# Patient Record
(demographics unavailable — no encounter records)

---

## 2024-12-11 NOTE — HISTORY OF PRESENT ILLNESS
[FreeTextEntry1] : 12/11/24 Reason for visit Follow up for CKD stage 4 with worsening renal function CHF Diabetic nephropathy Proteinuria HTN Interval events: He's been coughing more often for a week especially at night; he's been urinating more and unable to sleep Denies fever or chills He had increased his dose of Torsemide 20 mg to 2x a day for 4 days without any improvements in his symptoms    PMH Advanced CKD stage 4 FANNY proteinuria DM HTN Hep C- has been treated and cured AFIB- s/p ablation x2 [facility] 04/2024 CVA- 4.5 years ago   PSH Ablation x2 [facility] 04/2024, 07/2024 Blood transfusion 4/27/2024 Cardioversion 01/2024 [place] that didn't work  Back surgery   SH: Nonsmoker Doesn't drink  worked as a    1 child   Initial eval 06/14/2016  PMH of DM< HTN, Hepatitis C, CHF seen at [facility] on 5/27/16 when he was admitted for uncontrolled HTN, CHF, FANNY Now for follow up, Denies NSAID use  PSH: Back Surgery in remote past

## 2024-12-11 NOTE — RESULTS/DATA
[TextEntry] : Labs: 12/4/24 144/4.6/108/18/65/3.78/gfr17/ca10.1 upcr 537  9/13/24 142/3.9/107/19/61/3.4/ca 9.7 pth 131 upcr 0.71  7/3/24 142/4.3/104/20/49/3.29/gfr20/ca10.4 glucose 123 upcr 0.46 HGB 12.4   5/2/24 142/3.9/103/23/54/3.19 ca 9.8 upcr 0.46  03/23/24 143/4.1/108/22/61/3.77/ ca 8.9 egfr 17 CBC:  2.96/7.3/106    1/16/24 //107/25/48/2.65, EGFR 26, calcium 9.3 CBC: 5.4/9.8/142 Urine protein and creatinine ratio 1.04  7/10/23 140/4.9/106/22/52/3.15 ( 21) ca 9.1 5.5/10.2/157   141/430/106/25/45/2.72, GFR 26, calcium 9.3 CBC: Six-point 4/1036/155 UPCR 2.18, vitamin D 74.6  9/13/22 145/4.4/107/23/41/2.37/egfr 30/ Ca 9.3/ Vitd 58 UPCR 1.59 CBC 7/13/159 2/23/22: 143/4/106/24/40/2.24 ca 9.6 EGFR 33 CBc: 6.9/13/150    10/27/21: 142/3.8/106/22/41/2.43 eGFR 28 Ca 9.1 7/12.7/134 7/6/2021 144/4.1/107/24/40/2.38 gfr 29 ca 9.1 cbc 6.8/12.3/134 upcr 2.14  4/5/2021 141/3.9/106/24/38/2.21 gfr 31 ca 9.2 cbc 7.0/12.4/141 vit d 25-oh 59.7 upcr 1.98 pth 108  1/21/2021 144/4.6/107/26/98/2.29 gfr 30 ca 9.4 cbc  6.7/13.4/142  12/03/2020 146/4.6/109/28/44/2.29/gfr 30 ca 9.4 cbc 7.7/11.9/156 10/3/20: 143/4.2/105/23/44/2.56  Ca 9.5 CBC: 8.6/11/182   7/10/10: 135/72  3/13/20 /78 5/17/19: 120/64 P 56 12/14/18 /100 P 60 7/10/20  Labs: 143/4.2/105/23/44/2.56 gfr 26 8.6/11.1/122 7/8/20: 142/4.6/106/24/47/2.8 gfr 23 ca 8.8 upcr 0.7  2/18 144/4.3/106/20/48/2.46 ca 9 cbc 4.5/11.7/131  9/9/19 142/4.4/107/24/49/2.33 gfr 30 ca 9.2 cbc ; 6.8/11.5/124 5/17//19: 145/4.9/110/23/35/2.28 ca 9.4 gfr 30  UPCR 1.8  CBC: 6/12.3/109  12/5/18: 143/3.5/108/24/42/2.57 Ca 9  5/9/18 144/4.2/104/21/40/2.11/ GFR 34 Ca 8.8   CBC: 4.9/12.3/108 1/10/18 145/3.8/104/20/38/2.29 gfr 31  1/24/17 142/4/102/23/44/3.2 4/25/17 144/4.3/104/21/38/1.87/ gfr 39 protein /creatinine ratio 2.8  3/22/17 141/4/103/21/28/2/120/9 cbc 5.7/107/121 1/6/17 142/4.1/105/22/28/1.62/ 124 CBC: 5.1/11.2/32.4/132 UPCR  2.9. . Physical Exam: 7/10/10: 135/72  3/13/20 /78 5/17/19: 120/64 P 56 12/14/18 /100 P 60  2/18 144/4.3/106/20/48/2.46 ca 9 cbc 4.5/11.7/131  9/9/19 142/4.4/107/24/49/2.33 gfr 30 ca 9.2 cbc ; 6.8/11.5/124 5/17//19: 145/4.9/110/23/35/2.28 ca 9.4 gfr 30  UPCR 1.8  CBC: 6/12.3/109  12/5/18: 143/3.5/108/24/42/2.57 Ca 9  5/9/18 144/4.2/104/21/40/2.11/ GFR 34 Ca 8.8   CBC: 4.9/12.3/108 1/10/18 145/3.8/104/20/38/2.29 gfr 31  1/24/17 142/4/102/23/44/3.2 4/25/17 144/4.3/104/21/38/1.87/ gfr 39 protein /creatinine ratio 2.8  3/22/17 141/4/103/21/28/2/120/9 cbc 5.7/107/121 1/6/17 142/4.1/105/22/28/1.62/ 124 CBC: 5.1/11.2/32.4/132 UPCR  2.9. . .    Work Ups: 1/21/2021 144/4.6/107/26/98/2.29 gfr 30 ca 9.4 cbc  6.7/13.4/142 UA in the office : +++ protein, no Blood  12/03/2020 146/4.6/109/28/44/2.29/gfr 30 ca 9.4 cbc 7.7/11.9/156 10/3/20: 143/4.2/105/23/44/2.56  Ca 9.5 CBC: 8.6/11/182   7/10/10: 135/72  3/13/20 /78 5/17/19: 120/64 P 56 12/14/18 /100 P 60 7/10/20  Labs: 143/4.2/105/23/44/2.56 gfr 26 8.6/11.1/122 7/8/20: 142/4.6/106/24/47/2.8 gfr 23 ca 8.8 upcr 0.7  2/18 144/4.3/106/20/48/2.46 ca 9 cbc 4.5/11.7/131  9/9/19 142/4.4/107/24/49/2.33 gfr 30 ca 9.2 cbc ; 6.8/11.5/124 5/17//19: 145/4.9/110/23/35/2.28 ca 9.4 gfr 30  UPCR 1.8  CBC: 6/12.3/109  12/5/18: 143/3.5/108/24/42/2.57 Ca 9  5/9/18 144/4.2/104/21/40/2.11/ GFR 34 Ca 8.8   CBC: 4.9/12.3/108 1/10/18 145/3.8/104/20/38/2.29 gfr 31  1/24/17 142/4/102/23/44/3.2 4/25/17 144/4.3/104/21/38/1.87/ gfr 39 protein /creatinine ratio 2.8  3/22/17 141/4/103/21/28/2/120/9 cbc 5.7/107/121 1/6/17 142/4.1/105/22/28/1.62/ 124 CBC: 5.1/11.2/32.4/132 UPCR  2.9. . Physical Exam: 7/10/10: 135/72  3/13/20 /78 5/17/19: 120/64 P 56 12/14/18 /100 P 60

## 2024-12-11 NOTE — ASSESSMENT
[FreeTextEntry1] : Assessment CKD stage 4: Renal function has slightly worsened can be due to progression + use of torsemide the cause of his cough is not clear his lungs are clear and he doesn't have any peripheral edema, doubt due to fluid overload  HTN: controlled Proteinuria is still persistent, 2+ Anemia: Going for ARANESP at [facility] Dr. Sarah Freeman bicarb can be due to CKD

## 2024-12-11 NOTE — PLAN
[TextEntry] : Plan: Cough: Obtain chest x ray, will try course of z-pack CKD stage 4/Proteinuria: Continue Losartan 50 mg, continue torsemide 20 mg 1x a day HTN: Nifedipine 60 mg,Losartan 50 mg LE edema: Torsemide 20 mg, now controlled CAD/AFIB- stable has been seen by cardio recently Anemia- Following with [facility] and going for ARANESP  Low bicarb: Start sodium bicarb 3x a week Maintain physical activity Low salt diet Follow up with recommended workups in 6 weeks

## 2024-12-11 NOTE — REVIEW OF SYSTEMS
[TextEntry] : Review of Systems: General: c/o of excessive coughing HEENT: no headache, no change in vision or hearing Pulmonary: No SOB, or cough  CVS: No chest pain, TELLES, or change in exercise tolerance  Gastrointestinal: No Nausea/vomiting/constipation/diarrhea : urinary frequency particularly at night Skin: Denies no new rash, lesions, ulcer  [place] skeletal: No edema  Neurological: No headache, dizziness, vertigo

## 2025-01-22 NOTE — PHYSICAL EXAM
[TextEntry] : Physical Examination: General: Not in acute distress Head: Normocephalic, no lesions Eyes:EOMI, Fundi normal Throat: Clear, no exudates or lesions Chest: Rales, SOB, coughing CVS: S1/S2, RR, no murmurs, no rubs Abdomen: Soft NT/ND, +BS Extremities: No edema, ulcers, or cyanosis Neurological: Alert, awake, no gross focal deficits

## 2025-01-22 NOTE — RESULTS/DATA
[TextEntry] : Labs: 01/17/25 142/4.7/109/19/46/3.52/gfr19/ca10.1 upcr 50 HGB 10  12/4/24 144/4.6/108/18/65/3.78/gfr17/ca10.1 upcr 537  9/13/24 142/3.9/107/19/61/3.4/ca 9.7 pth 131 upcr 0.71  7/3/24 142/4.3/104/20/49/3.29/gfr20/ca10.4 glucose 123 upcr 0.46 HGB 12.4   5/2/24 142/3.9/103/23/54/3.19 ca 9.8 upcr 0.46  03/23/24 143/4.1/108/22/61/3.77/ ca 8.9 egfr 17 CBC:  2.96/7.3/106    1/16/24 //107/25/48/2.65, EGFR 26, calcium 9.3 CBC: 5.4/9.8/142 Urine protein and creatinine ratio 1.04  7/10/23 140/4.9/106/22/52/3.15 ( 21) ca 9.1 5.5/10.2/157   141/430/106/25/45/2.72, GFR 26, calcium 9.3 CBC: Six-point 4/1036/155 UPCR 2.18, vitamin D 74.6  9/13/22 145/4.4/107/23/41/2.37/egfr 30/ Ca 9.3/ Vitd 58 UPCR 1.59 CBC 7/13/159 2/23/22: 143/4/106/24/40/2.24 ca 9.6 EGFR 33 CBc: 6.9/13/150    10/27/21: 142/3.8/106/22/41/2.43 eGFR 28 Ca 9.1 7/12.7/134 7/6/2021 144/4.1/107/24/40/2.38 gfr 29 ca 9.1 cbc 6.8/12.3/134 upcr 2.14  4/5/2021 141/3.9/106/24/38/2.21 gfr 31 ca 9.2 cbc 7.0/12.4/141 vit d 25-oh 59.7 upcr 1.98 pth 108  1/21/2021 144/4.6/107/26/98/2.29 gfr 30 ca 9.4 cbc  6.7/13.4/142  12/03/2020 146/4.6/109/28/44/2.29/gfr 30 ca 9.4 cbc 7.7/11.9/156 10/3/20: 143/4.2/105/23/44/2.56  Ca 9.5 CBC: 8.6/11/182   7/10/10: 135/72  3/13/20 /78 5/17/19: 120/64 P 56 12/14/18 /100 P 60 7/10/20  Labs: 143/4.2/105/23/44/2.56 gfr 26 8.6/11.1/122 7/8/20: 142/4.6/106/24/47/2.8 gfr 23 ca 8.8 upcr 0.7  2/18 144/4.3/106/20/48/2.46 ca 9 cbc 4.5/11.7/131  9/9/19 142/4.4/107/24/49/2.33 gfr 30 ca 9.2 cbc ; 6.8/11.5/124 5/17//19: 145/4.9/110/23/35/2.28 ca 9.4 gfr 30  UPCR 1.8  CBC: 6/12.3/109  12/5/18: 143/3.5/108/24/42/2.57 Ca 9  5/9/18 144/4.2/104/21/40/2.11/ GFR 34 Ca 8.8   CBC: 4.9/12.3/108 1/10/18 145/3.8/104/20/38/2.29 gfr 31  1/24/17 142/4/102/23/44/3.2 4/25/17 144/4.3/104/21/38/1.87/ gfr 39 protein /creatinine ratio 2.8  3/22/17 141/4/103/21/28/2/120/9 cbc 5.7/107/121 1/6/17 142/4.1/105/22/28/1.62/ 124 CBC: 5.1/11.2/32.4/132 UPCR  2.9. . Physical Exam: 7/10/10: 135/72  3/13/20 /78 5/17/19: 120/64 P 56 12/14/18 /100 P 60  2/18 144/4.3/106/20/48/2.46 ca 9 cbc 4.5/11.7/131  9/9/19 142/4.4/107/24/49/2.33 gfr 30 ca 9.2 cbc ; 6.8/11.5/124 5/17//19: 145/4.9/110/23/35/2.28 ca 9.4 gfr 30  UPCR 1.8  CBC: 6/12.3/109  12/5/18: 143/3.5/108/24/42/2.57 Ca 9  5/9/18 144/4.2/104/21/40/2.11/ GFR 34 Ca 8.8   CBC: 4.9/12.3/108 1/10/18 145/3.8/104/20/38/2.29 gfr 31  1/24/17 142/4/102/23/44/3.2 4/25/17 144/4.3/104/21/38/1.87/ gfr 39 protein /creatinine ratio 2.8  3/22/17 141/4/103/21/28/2/120/9 cbc 5.7/107/121 1/6/17 142/4.1/105/22/28/1.62/ 124 CBC: 5.1/11.2/32.4/132 UPCR  2.9. . .    Work Ups: 1/21/2021 144/4.6/107/26/98/2.29 gfr 30 ca 9.4 cbc  6.7/13.4/142 UA in the office : +++ protein, no Blood  12/03/2020 146/4.6/109/28/44/2.29/gfr 30 ca 9.4 cbc 7.7/11.9/156 10/3/20: 143/4.2/105/23/44/2.56  Ca 9.5 CBC: 8.6/11/182   7/10/10: 135/72  3/13/20 /78 5/17/19: 120/64 P 56 12/14/18 /100 P 60 7/10/20  Labs: 143/4.2/105/23/44/2.56 gfr 26 8.6/11.1/122 7/8/20: 142/4.6/106/24/47/2.8 gfr 23 ca 8.8 upcr 0.7  2/18 144/4.3/106/20/48/2.46 ca 9 cbc 4.5/11.7/131  9/9/19 142/4.4/107/24/49/2.33 gfr 30 ca 9.2 cbc ; 6.8/11.5/124 5/17//19: 145/4.9/110/23/35/2.28 ca 9.4 gfr 30  UPCR 1.8  CBC: 6/12.3/109  12/5/18: 143/3.5/108/24/42/2.57 Ca 9  5/9/18 144/4.2/104/21/40/2.11/ GFR 34 Ca 8.8   CBC: 4.9/12.3/108 1/10/18 145/3.8/104/20/38/2.29 gfr 31  1/24/17 142/4/102/23/44/3.2 4/25/17 144/4.3/104/21/38/1.87/ gfr 39 protein /creatinine ratio 2.8  3/22/17 141/4/103/21/28/2/120/9 cbc 5.7/107/121 1/6/17 142/4.1/105/22/28/1.62/ 124 CBC: 5.1/11.2/32.4/132 UPCR  2.9. . Physical Exam: 7/10/10: 135/72  3/13/20 /78 5/17/19: 120/64 P 56 12/14/18 /100 P 60

## 2025-01-22 NOTE — PLAN
[TextEntry] : Plan: CKD Stage 4: Change Torsemide 20 mg to daily, Losartan 50 mg HTN: Nifedipine 60 mg, Losartan 50mg Proteinuria: Low protein diet, Losartan 50 mg Anemia: Following with [facility] and going for ARANESP Based on current volume status and electrolytes, he is stable from renal standpoint to have endoscopy/colonoscopy  Low salt diet Maintain physical activity  Follow up with recommended workups in 2 months

## 2025-01-22 NOTE — ASSESSMENT
[FreeTextEntry1] : Assessment FANNY improved CKD stage 4: Renal function has slightly improved HTN: controlled  Proteinuria: Has improved No peripheral edema, having SOB and cough Anemia: HGB has improved

## 2025-01-22 NOTE — HISTORY OF PRESENT ILLNESS
[FreeTextEntry1] : 01/22/25 reason for visit Follow up for CKD stage 4 w/ worsening renal function CHF HTN Proteinuria No significant interval events   His coughing has gotten slightly better but it is still causing discomfort at night     12/11/24 Reason for visit Follow up for CKD stage 4 with worsening renal function CHF Diabetic nephropathy Proteinuria HTN Interval events: He's been coughing more often for a week especially at night; he's been urinating more and unable to sleep Denies fever or chills He had increased his dose of Torsemide 20 mg to 2x a day for 4 days without any improvements in his symptoms    PMH Advanced CKD stage 4 FANNY proteinuria DM HTN Hep C- has been treated and cured AFIB- s/p ablation x2 [facility] 04/2024 CVA- 4.5 years ago   PSH Ablation x2 [facility] 04/2024, 07/2024 Blood transfusion 4/27/2024 Cardioversion 01/2024 [place] that didn't work  Back surgery   SH: Nonsmoker Doesn't drink  worked as a    1 child   Initial eval 06/14/2016  PMH of DM< HTN, Hepatitis C, CHF seen at [facility] on 5/27/16 when he was admitted for uncontrolled HTN, CHF, FANNY Now for follow up, Denies NSAID use  PSH: Back Surgery in remote past

## 2025-02-07 NOTE — PHYSICAL EXAM
[TextEntry] : Physical Examination: General: Not in acute distress Head: Normocephalic, no lesions Eyes:EOMI, Fundi normal Throat: Clear, no exudates or lesions Chest: Slight Crackles , No SOB CVS: S1/S2, RR, no murmurs, no rubs Abdomen: Soft NT/ND, +BS Extremities: No edema, ulcers, or cyanosis Neurological: Alert, awake, no gross focal deficits

## 2025-02-07 NOTE — ASSESSMENT
[FreeTextEntry1] : Assessment: FANNY: Worsening renal function, possible progression of CKD and recent pneumonia Advanced CKD stage 4 Metabolic acidosis due to CKD  Hypercalcemia: further worsening Pneumonia: Doesn't show signs of CHF HTN: not well controlled  No peripheral edema

## 2025-02-07 NOTE — HISTORY OF PRESENT ILLNESS
[FreeTextEntry1] : 02/07/25 Reason for visit Follow up for Recent FANNY CKD stage 4 HTN Interval events He had the flu then went to [facility] for pneumonia for 1 week, he was discharged When he was in the hospital his renal function worsened, his creatinine went above 4 Losartan and Nifedipine were discontinued, and he was started on Bicarb 650 mg 1x a day but no prescription was given and Carvedilol 12.5 mg     01/22/25 reason for visit Follow up for CKD stage 4 w/ worsening renal function CHF HTN Proteinuria No significant interval events   His coughing has gotten slightly better but it is still causing discomfort at night     12/11/24 Reason for visit Follow up for CKD stage 4 with worsening renal function CHF Diabetic nephropathy Proteinuria HTN Interval events: He's been coughing more often for a week especially at night; he's been urinating more and unable to sleep Denies fever or chills He had increased his dose of Torsemide 20 mg to 2x a day for 4 days without any improvements in his symptoms    PMH Advanced CKD stage 4 FANNY proteinuria DM HTN Hep C- has been treated and cured AFIB- s/p ablation x2 [facility] 04/2024 CVA- 4.5 years ago   PSH Ablation x2 [facility] 04/2024, 07/2024 Blood transfusion 4/27/2024 Cardioversion 01/2024 [place] that didn't work  Back surgery   SH: Nonsmoker Doesn't drink  worked as a    1 child   Initial eval 06/14/2016  PMH of DM< HTN, Hepatitis C, CHF seen at [facility] on 5/27/16 when he was admitted for uncontrolled HTN, CHF, FANNY Now for follow up, Denies NSAID use  PSH: Back Surgery in remote past

## 2025-02-07 NOTE — RESULTS/DATA
[TextEntry] : Labs: 2/3/25 141/4.8/104/25/69/3.72/gfr17/ca10.7 UPCR 0.46  01/17/25 142/4.7/109/19/46/3.52/gfr19/ca10.1 upcr 50 HGB 10  12/4/24 144/4.6/108/18/65/3.78/gfr17/ca10.1 upcr 537  9/13/24 142/3.9/107/19/61/3.4/ca 9.7 pth 131 upcr 0.71  7/3/24 142/4.3/104/20/49/3.29/gfr20/ca10.4 glucose 123 upcr 0.46 HGB 12.4   5/2/24 142/3.9/103/23/54/3.19 ca 9.8 upcr 0.46  03/23/24 143/4.1/108/22/61/3.77/ ca 8.9 egfr 17 CBC:  2.96/7.3/106    1/16/24 //107/25/48/2.65, EGFR 26, calcium 9.3 CBC: 5.4/9.8/142 Urine protein and creatinine ratio 1.04  7/10/23 140/4.9/106/22/52/3.15 ( 21) ca 9.1 5.5/10.2/157   141/430/106/25/45/2.72, GFR 26, calcium 9.3 CBC: Six-point 4/1036/155 UPCR 2.18, vitamin D 74.6  9/13/22 145/4.4/107/23/41/2.37/egfr 30/ Ca 9.3/ Vitd 58 UPCR 1.59 CBC 7/13/159 2/23/22: 143/4/106/24/40/2.24 ca 9.6 EGFR 33 CBc: 6.9/13/150    10/27/21: 142/3.8/106/22/41/2.43 eGFR 28 Ca 9.1 7/12.7/134 7/6/2021 144/4.1/107/24/40/2.38 gfr 29 ca 9.1 cbc 6.8/12.3/134 upcr 2.14  4/5/2021 141/3.9/106/24/38/2.21 gfr 31 ca 9.2 cbc 7.0/12.4/141 vit d 25-oh 59.7 upcr 1.98 pth 108  1/21/2021 144/4.6/107/26/98/2.29 gfr 30 ca 9.4 cbc  6.7/13.4/142  12/03/2020 146/4.6/109/28/44/2.29/gfr 30 ca 9.4 cbc 7.7/11.9/156 10/3/20: 143/4.2/105/23/44/2.56  Ca 9.5 CBC: 8.6/11/182   7/10/10: 135/72  3/13/20 /78 5/17/19: 120/64 P 56 12/14/18 /100 P 60 7/10/20  Labs: 143/4.2/105/23/44/2.56 gfr 26 8.6/11.1/122 7/8/20: 142/4.6/106/24/47/2.8 gfr 23 ca 8.8 upcr 0.7  2/18 144/4.3/106/20/48/2.46 ca 9 cbc 4.5/11.7/131  9/9/19 142/4.4/107/24/49/2.33 gfr 30 ca 9.2 cbc ; 6.8/11.5/124 5/17//19: 145/4.9/110/23/35/2.28 ca 9.4 gfr 30  UPCR 1.8  CBC: 6/12.3/109  12/5/18: 143/3.5/108/24/42/2.57 Ca 9  5/9/18 144/4.2/104/21/40/2.11/ GFR 34 Ca 8.8   CBC: 4.9/12.3/108 1/10/18 145/3.8/104/20/38/2.29 gfr 31  1/24/17 142/4/102/23/44/3.2 4/25/17 144/4.3/104/21/38/1.87/ gfr 39 protein /creatinine ratio 2.8  3/22/17 141/4/103/21/28/2/120/9 cbc 5.7/107/121 1/6/17 142/4.1/105/22/28/1.62/ 124 CBC: 5.1/11.2/32.4/132 UPCR  2.9. . Physical Exam: 7/10/10: 135/72  3/13/20 /78 5/17/19: 120/64 P 56 12/14/18 /100 P 60  2/18 144/4.3/106/20/48/2.46 ca 9 cbc 4.5/11.7/131  9/9/19 142/4.4/107/24/49/2.33 gfr 30 ca 9.2 cbc ; 6.8/11.5/124 5/17//19: 145/4.9/110/23/35/2.28 ca 9.4 gfr 30  UPCR 1.8  CBC: 6/12.3/109  12/5/18: 143/3.5/108/24/42/2.57 Ca 9  5/9/18 144/4.2/104/21/40/2.11/ GFR 34 Ca 8.8   CBC: 4.9/12.3/108 1/10/18 145/3.8/104/20/38/2.29 gfr 31  1/24/17 142/4/102/23/44/3.2 4/25/17 144/4.3/104/21/38/1.87/ gfr 39 protein /creatinine ratio 2.8  3/22/17 141/4/103/21/28/2/120/9 cbc 5.7/107/121 1/6/17 142/4.1/105/22/28/1.62/ 124 CBC: 5.1/11.2/32.4/132 UPCR  2.9. . .    Work Ups: 1/21/2021 144/4.6/107/26/98/2.29 gfr 30 ca 9.4 cbc  6.7/13.4/142 UA in the office : +++ protein, no Blood  12/03/2020 146/4.6/109/28/44/2.29/gfr 30 ca 9.4 cbc 7.7/11.9/156 10/3/20: 143/4.2/105/23/44/2.56  Ca 9.5 CBC: 8.6/11/182   7/10/10: 135/72  3/13/20 /78 5/17/19: 120/64 P 56 12/14/18 /100 P 60 7/10/20  Labs: 143/4.2/105/23/44/2.56 gfr 26 8.6/11.1/122 7/8/20: 142/4.6/106/24/47/2.8 gfr 23 ca 8.8 upcr 0.7  2/18 144/4.3/106/20/48/2.46 ca 9 cbc 4.5/11.7/131  9/9/19 142/4.4/107/24/49/2.33 gfr 30 ca 9.2 cbc ; 6.8/11.5/124 5/17//19: 145/4.9/110/23/35/2.28 ca 9.4 gfr 30  UPCR 1.8  CBC: 6/12.3/109  12/5/18: 143/3.5/108/24/42/2.57 Ca 9  5/9/18 144/4.2/104/21/40/2.11/ GFR 34 Ca 8.8   CBC: 4.9/12.3/108 1/10/18 145/3.8/104/20/38/2.29 gfr 31  1/24/17 142/4/102/23/44/3.2 4/25/17 144/4.3/104/21/38/1.87/ gfr 39 protein /creatinine ratio 2.8  3/22/17 141/4/103/21/28/2/120/9 cbc 5.7/107/121 1/6/17 142/4.1/105/22/28/1.62/ 124 CBC: 5.1/11.2/32.4/132 UPCR  2.9. . Physical Exam: 7/10/10: 135/72  3/13/20 /78 5/17/19: 120/64 P 56 12/14/18 /100 P 60

## 2025-02-07 NOTE — PLAN
[TextEntry] : Plan: Discussed the possibility of near future RRT including all options HD, PD and renal transplant  Check PTH, UA, UPCR   CKD stage 4: Hold Losartan 25 mg for the time being  HTN: Resume Nifedipine 60 mg, Hydralazine 50 mg daily, Isosorbide 20 mg,Torsemide 20 mg daily ( if needed increase to 2 tablets if breathing worsens or LE edema), Start Carvedilol 12.5 mg 2x daily  Hypercalcemia: Hold Vitamin D Take sodium bicarb 1x daily  Eventually will refer to renal transplant evaluation  Maintain physical activity  Follow up with recommended workups in 1 month

## 2025-02-07 NOTE — HISTORY OF PRESENT ILLNESS
[FreeTextEntry1] : 02/07/25 Reason for visit Follow up for Recent FANNY CKD stage 4 HTN Interval events He had the flu then went to [facility] for pneumonia for 1 week, he was discharged When he was in the hospital his renal function worsened, his creatinine went above 4 Losartan and Nifedipine were discontinued, and he was started on Bicarb 650 mg 1x a day but no prescription was given and Carvedilol 12.5 mg     01/22/25 reason for visit Follow up for CKD stage 4 w/ worsening renal function CHF HTN Proteinuria No significant interval events   His coughing has gotten slightly better but it is still causing discomfort at night     12/11/24 Reason for visit Follow up for CKD stage 4 with worsening renal function CHF Diabetic nephropathy Proteinuria HTN Interval events: He's been coughing more often for a week especially at night; he's been urinating more and unable to sleep Denies fever or chills He had increased his dose of Torsemide 20 mg to 2x a day for 4 days without any improvements in his symptoms    PMH Advanced CKD stage 4 FANNY proteinuria DM HTN Hep C- has been treated and cured AFIB- s/p ablation x2 [facility] 04/2024 CVA- 4.5 years ago   PSH Ablation x2 [facility] 04/2024, 07/2024 Blood transfusion 4/27/2024 Cardioversion 01/2024 [place] that didn't work  Back surgery   SH: Nonsmoker Doesn't drink  worked as a    1 child   Initial eval 06/14/2016  PMH of DM< HTN, Hepatitis C, CHF seen at [facility] on 5/27/16 when he was admitted for uncontrolled HTN, CHF, FANNY Now for follow up, Denies NSAID use  PSH: Back Surgery in remote past

## 2025-02-19 NOTE — ASSESSMENT
[FreeTextEntry1] : SWATI BAPTISTE is a 64 year old male with a PMH of Cirrhosis, Hepatitis C s/p Harvoni in 2015 (SVR achieved), CHF, MI, Stroke on Eliquis, HTN, CKD and chronic anemia.  Cirrhosis -Etiology likely HCV. HBV serologies ordered to r/u on remote +HBcIgM. -Disease progression of cirrhosis discussed, including but not limited to hepatocellular carcinoma, varices with or without bleeding, hepatic encephalopathy, ascites, liver failure and death.   HCC Screening -I have explained the need for imaging every 6 months to assess for HCC. -US Abdo doppler and AFP ordered. [facility] for [facility].   Ascites -euvolemic. Pt on Torsemide, managed by cardiologist. -Pt was counseled to adhere to a low sodium (<2 grams of sodium per day) diet by - avoiding adding any salt to meals (removing the saltshaker from the table); eliminating salty foods from diet; eating more home-cooked meals; choosing fresh or frozen, not canned, vegetables and fruits; and reading ingredient and food labels to choose low sodium foods.   Variceal Screening -EGD pending w/[place] [facility], asked pt to have report forwarded to our office once done -if pt experiences hematemesis, advised to go to ER immediately   Encephalopathy -notify provider if new onset confusion -at least 1-2 bms/day   Transplant -I have explained that disease can progress to the point of requiring an evaluation for liver transplantation. -MELDNa - will calculate after labs   Health Maintenance -Can take up to 2G Tylenol per day. NO NSAIDs as these can lead to diuretic resistance and precipitate renal dysfunction in patients with advanced liver disease. -High Protein Low Fat Low Salt (up to 2G Na/day) Diet, no prolonged fasting, Mediterranean Diet recommended. -Continue to abstain from alcohol and all illicit drugs. -Avoid use of herbal and dietary supplements due to potential hepatotoxicity. -Avoid eating any unpasteurized dairy products; avoid eating any raw or undercooked eggs, fish, poultry, or meat; and avoid eating raw/steamed oysters or other shellfish to avoid risk of infection.   Follow up in 6 months w/labs and imaging

## 2025-02-19 NOTE — PHYSICAL EXAM
[Non-Tender] : non-tender [General Appearance - Alert] : alert [Edema] : there was no peripheral edema [Abdomen Soft] : soft [Bowel Sounds] : normal bowel sounds [Abdomen Tenderness] : non-tender [] : no hepato-splenomegaly [Abdomen Mass (___ Cm)] : no abdominal mass palpated [Oriented To Time, Place, And Person] : oriented to person, place, and time [Scleral Icterus] : No Scleral Icterus [Spider Angioma] : No spider angioma(s) were observed [Abdominal  Ascites] : no ascites [Asterixis] : no asterixis observed [Jaundice] : No jaundice [Palmar Erythema] : no Palmar Erythema [Depression] : no depression

## 2025-02-19 NOTE — ADDENDUM
[FreeTextEntry1] : I, Carlene Griffith NP, acted as scribe for ERIC Reddy for this patient encounter.

## 2025-02-19 NOTE — ASSESSMENT
[FreeTextEntry1] : SWATI BAPTISTE is a 64 year old male with a PMH of Cirrhosis, Hepatitis C s/p Harvoni in 2015 (SVR achieved), CHF, MI, Stroke on Eliquis, HTN, CKD and chronic anemia.  Cirrhosis -Etiology likely HCV. HBV serologies ordered to r/u on remote +HBcIgM. -Disease progression of cirrhosis discussed, including but not limited to hepatocellular carcinoma, varices with or without bleeding, hepatic encephalopathy, ascites, liver failure and death.   HCC Screening -I have explained the need for imaging every 6 months to assess for HCC. -US Abdo doppler and AFP ordered. [facility].   Ascites -euvolemic. Pt on Torsemide, managed by cardiologist. -Pt was counseled to adhere to a low sodium (<2 grams of sodium per day) diet by - avoiding adding any salt to meals (removing the saltshaker from the table); eliminating salty foods from diet; eating more home-cooked meals; choosing fresh or frozen, not canned, vegetables and fruits; and reading ingredient and food labels to choose low sodium foods.   Variceal Screening -EGD pending w/[facility], asked pt to have report forwarded to our office once done -if pt experiences hematemesis, advised to go to ER immediately   Encephalopathy -notify provider if new onset confusion -at least 1-2 bms/day   Transplant -I have explained that disease can progress to the point of requiring an evaluation for liver transplantation. -MELDNa - will calculate after labs   Health Maintenance -Can take up to 2G Tylenol per day. NO NSAIDs as these can lead to diuretic resistance and precipitate renal dysfunction in patients with advanced liver disease. -High Protein Low Fat Low Salt (up to 2G Na/day) Diet, no prolonged fasting, Mediterranean Diet recommended. -Continue to abstain from alcohol and all illicit drugs. -Avoid use of herbal and dietary supplements due to potential hepatotoxicity. -Avoid eating any unpasteurized dairy products; avoid eating any raw or undercooked eggs, fish, poultry, or meat; and avoid eating raw/steamed oysters or other shellfish to avoid risk of infection.   Follow up in 6 months w/labs and imaging

## 2025-02-19 NOTE — HISTORY OF PRESENT ILLNESS
Telephone Encounter by Mone Matta CMA at 04/21/17 03:59 PM     Author:  Mone Matta CMA Service:  (none) Author Type:  Certified Medical Assistant     Filed:  04/21/17 04:01 PM Encounter Date:  4/20/2017 Status:  Signed     :  Mone Matta CMA (Certified Medical Assistant)            Dr. Davies-[Mone Matta CMA Certified Medical Assistant Sign    M - Manual, T - Template            
[de-identified] : SWATI BAPTISTE is a 64 year old male with a PMH of Cirrhosis, Hepatitis C s/p Harvoni in 2015 (SVR achieved), CHF, MI, Stroke on Eliquis, HTN, CKD and chronic anemia.   2/12/25: He presents today for evaluation of cirrhosis, [place] [facility] [place]. Records reviewed, including notes, labs, imaging, etc. Per pt, diagnosed w/cirrhosis 40 years ago. He was found to have hepatitis c in his 20s, treated 10 years ago w/Harvoni and SVR achieved. Cirrhosis noted on imaging, last imaging on file from 2023. Disease significant alcohol consumption, past or presently. HBcIgM reactive in 2023, rest of the HBV serologies negative. Chronic liver disease workup otherwise negative including autoimmune serologies, A1AT and ceruloplasmin. Labs 1/11/25 - tbili 1.9, AST 14, ALT 10, AP 61, h/h 8.2/28, .  Chronic anemia [place] by [place], receives periodic iron IV. No EGD/colon but pending scheduling. Was recently hospitalized at [facility] for the flu/pneumonia. Denies abdominal pain or distension, jaundice, hematemesis, hematochezia, dark urine, confusion or unintentional weight loss. Denies family history of liver disease.
[de-identified] : SWATI BAPTISTE is a 64 year old male with a PMH of Cirrhosis, Hepatitis C s/p Harvoni in 2015 (SVR achieved), CHF, MI, Stroke on Eliquis, HTN, CKD and chronic anemia.   2/12/25: He presents today for evaluation of cirrhosis, [place] [facility] reviewed, including notes, labs, imaging, etc. Per pt, diagnosed w/cirrhosis 40 years ago. He was found to have hepatitis c in his 20s, treated 10 years ago w/Harvoni and SVR achieved. Cirrhosis noted on imaging, last imaging on file from 2023. Disease significant alcohol consumption, past or presently. HBcIgM reactive in 2023, rest of the HBV serologies negative. Chronic liver disease workup otherwise negative including autoimmune serologies, A1AT and ceruloplasmin. Labs 1/11/25 - tbili 1.9, AST 14, ALT 10, AP 61, h/h 8.2/28, .  Chronic anemia [place] by [place], receives periodic iron IV. No EGD/colon but pending scheduling. Was recently hospitalized at [facility] for the flu/pneumonia. Denies abdominal pain or distension, jaundice, hematemesis, hematochezia, dark urine, confusion or unintentional weight loss. Denies family history of liver disease.

## 2025-03-07 NOTE — ASSESSMENT
[FreeTextEntry1] : Assessment: CKD stage 4: Renal function is essentially unchanged  HTN: controlled  PTH is slightly elevated  Slight peripheral edema Slight SOB He is feeling fatigue

## 2025-03-07 NOTE — HISTORY OF PRESENT ILLNESS
[FreeTextEntry1] : 03/07/25 Reason for visit Follow up for CKD stage 4 HTN LE edema  He's having a aortic valve clip placement for leaky valve next Tuesday 02/07/25 Reason for visit Follow up for Recent FANNY CKD stage 4 HTN Interval events He had the flu then went to [facility] for pneumonia for 1 week, he was discharged When he was in the hospital his renal function worsened, his creatinine went above 4 Losartan and Nifedipine were discontinued, and he was started on Bicarb 650 mg 1x a day but no prescription was given and Carvedilol 12.5 mg     01/22/25 reason for visit Follow up for CKD stage 4 w/ worsening renal function CHF HTN Proteinuria No significant interval events   His coughing has gotten slightly better but it is still causing discomfort at night     12/11/24 Reason for visit Follow up for CKD stage 4 with worsening renal function CHF Diabetic nephropathy Proteinuria HTN Interval events: He's been coughing more often for a week especially at night; he's been urinating more and unable to sleep Denies fever or chills He had increased his dose of Torsemide 20 mg to 2x a day for 4 days without any improvements in his symptoms    PMH Advanced CKD stage 4 FANNY proteinuria DM HTN Hep C- has been treated and cured AFIB- s/p ablation x2 [facility] 04/2024 CVA- 4.5 years ago   PSH Ablation x2 [facility] 04/2024, 07/2024 Blood transfusion 4/27/2024 Cardioversion 01/2024 [place] that didn't work  Back surgery   SH: Nonsmoker Doesn't drink  worked as a    1 child   Initial eval 06/14/2016  PMH of DM< HTN, Hepatitis C, CHF seen at [facility] on 5/27/16 when he was admitted for uncontrolled HTN, CHF, FANNY Now for follow up, Denies NSAID use  PSH: Back Surgery in remote past

## 2025-03-07 NOTE — RESULTS/DATA
[TextEntry] : Labs: 3/4/25 141/4.5/103/24/51/3.73/gfr17/ca9.6 HGB 10.4   2/3/25 141/4.8/104/25/69/3.72/gfr17/ca10.7 UPCR 0.46  01/17/25 142/4.7/109/19/46/3.52/gfr19/ca10.1 upcr 50 HGB 10  12/4/24 144/4.6/108/18/65/3.78/gfr17/ca10.1 upcr 537  9/13/24 142/3.9/107/19/61/3.4/ca 9.7 pth 131 upcr 0.71  7/3/24 142/4.3/104/20/49/3.29/gfr20/ca10.4 glucose 123 upcr 0.46 HGB 12.4   5/2/24 142/3.9/103/23/54/3.19 ca 9.8 upcr 0.46  03/23/24 143/4.1/108/22/61/3.77/ ca 8.9 egfr 17 CBC:  2.96/7.3/106    1/16/24 //107/25/48/2.65, EGFR 26, calcium 9.3 CBC: 5.4/9.8/142 Urine protein and creatinine ratio 1.04  7/10/23 140/4.9/106/22/52/3.15 ( 21) ca 9.1 5.5/10.2/157   141/430/106/25/45/2.72, GFR 26, calcium 9.3 CBC: [place]point 4/1036/155 UPCR 2.18, vitamin D 74.6  9/13/22 145/4.4/107/23/41/2.37/egfr 30/ Ca 9.3/ Vitd 58 UPCR 1.59 CBC 7/13/159 2/23/22: 143/4/106/24/40/2.24 ca 9.6 EGFR 33 CBc: 6.9/13/150    10/27/21: 142/3.8/106/22/41/2.43 eGFR 28 Ca 9.1 7/12.7/134 7/6/2021 144/4.1/107/24/40/2.38 gfr 29 ca 9.1 cbc 6.8/12.3/134 upcr 2.14  4/5/2021 141/3.9/106/24/38/2.21 gfr 31 ca 9.2 cbc 7.0/12.4/141 vit d 25-oh 59.7 upcr 1.98 pth 108  1/21/2021 144/4.6/107/26/98/2.29 gfr 30 ca 9.4 cbc  6.7/13.4/142  12/03/2020 146/4.6/109/28/44/2.29/gfr 30 ca 9.4 cbc 7.7/11.9/156 10/3/20: 143/4.2/105/23/44/2.56  Ca 9.5 CBC: 8.6/11/182   7/10/10: 135/72  3/13/20 /78 5/17/19: 120/64 P 56 12/14/18 /100 P 60 7/10/20  Labs: 143/4.2/105/23/44/2.56 gfr 26 8.6/11.1/122 7/8/20: 142/4.6/106/24/47/2.8 gfr 23 ca 8.8 upcr 0.7  2/18 144/4.3/106/20/48/2.46 ca 9 cbc 4.5/11.7/131  9/9/19 142/4.4/107/24/49/2.33 gfr 30 ca 9.2 cbc ; 6.8/11.5/124 5/17//19: 145/4.9/110/23/35/2.28 ca 9.4 gfr 30  UPCR 1.8  CBC: 6/12.3/109  12/5/18: 143/3.5/108/24/42/2.57 Ca 9  5/9/18 144/4.2/104/21/40/2.11/ GFR 34 Ca 8.8   CBC: 4.9/12.3/108 1/10/18 145/3.8/104/20/38/2.29 gfr 31  1/24/17 142/4/102/23/44/3.2 4/25/17 144/4.3/104/21/38/1.87/ gfr 39 protein /creatinine ratio 2.8  3/22/17 141/4/103/21/28/2/120/9 cbc 5.7/107/121 1/6/17 142/4.1/105/22/28/1.62/ 124 CBC: 5.1/11.2/32.4/132 UPCR  2.9. . Physical Exam: 7/10/10: 135/72  3/13/20 /78 5/17/19: 120/64 P 56 12/14/18 /100 P 60  2/18 144/4.3/106/20/48/2.46 ca 9 cbc 4.5/11.7/131  9/9/19 142/4.4/107/24/49/2.33 gfr 30 ca 9.2 cbc ; 6.8/11.5/124 5/17//19: 145/4.9/110/23/35/2.28 ca 9.4 gfr 30  UPCR 1.8  CBC: 6/12.3/109  12/5/18: 143/3.5/108/24/42/2.57 Ca 9  5/9/18 144/4.2/104/21/40/2.11/ GFR 34 Ca 8.8   CBC: 4.9/12.3/108 1/10/18 145/3.8/104/20/38/2.29 gfr 31  1/24/17 142/4/102/23/44/3.2 4/25/17 144/4.3/104/21/38/1.87/ gfr 39 protein /creatinine ratio 2.8  3/22/17 141/4/103/21/28/2/120/9 cbc 5.7/107/121 1/6/17 142/4.1/105/22/28/1.62/ 124 CBC: 5.1/11.2/32.4/132 UPCR  2.9. . .    Work Ups: 1/21/2021 144/4.6/107/26/98/2.29 gfr 30 ca 9.4 cbc  6.7/13.4/142 UA in the office : +++ protein, no Blood  12/03/2020 146/4.6/109/28/44/2.29/gfr 30 ca 9.4 cbc 7.7/11.9/156 10/3/20: 143/4.2/105/23/44/2.56  Ca 9.5 CBC: 8.6/11/182   7/10/10: 135/72  3/13/20 /78 5/17/19: 120/64 P 56 12/14/18 /100 P 60 7/10/20  Labs: 143/4.2/105/23/44/2.56 gfr 26 8.6/11.1/122 7/8/20: 142/4.6/106/24/47/2.8 gfr 23 ca 8.8 upcr 0.7  2/18 144/4.3/106/20/48/2.46 ca 9 cbc 4.5/11.7/131  9/9/19 142/4.4/107/24/49/2.33 gfr 30 ca 9.2 cbc ; 6.8/11.5/124 5/17//19: 145/4.9/110/23/35/2.28 ca 9.4 gfr 30  UPCR 1.8  CBC: 6/12.3/109  12/5/18: 143/3.5/108/24/42/2.57 Ca 9  5/9/18 144/4.2/104/21/40/2.11/ GFR 34 Ca 8.8   CBC: 4.9/12.3/108 1/10/18 145/3.8/104/20/38/2.29 gfr 31  1/24/17 142/4/102/23/44/3.2 4/25/17 144/4.3/104/21/38/1.87/ gfr 39 protein /creatinine ratio 2.8  3/22/17 141/4/103/21/28/2/120/9 cbc 5.7/107/121 1/6/17 142/4.1/105/22/28/1.62/ 124 CBC: 5.1/11.2/32.4/132 UPCR  2.9. . Physical Exam: 7/10/10: 135/72  3/13/20 /78 5/17/19: 120/64 P 56 12/14/18 /100 P 60

## 2025-03-07 NOTE — PLAN
[TextEntry] : Plan: Discusses risks and benefits of RRT such as HD, PD, or Transplant Referred to [facility] transplant team  Plan possible AV access placement during next visit  CKD stage 4: Change Torsemide 40 mg-20 mg alternate HTN: Change Torsemide 40 mg-20 mg alternate, Nifedipine 60 mg daily, Hydralazine 50 mg daily, Carvedilol 12.5 mg 2x daily Hypercalcemia: Vitamin D 50,000 weekly, Start Calcitriol 0.25 mg 2x weekly Sodium bicarb 650 mg  See hematologist, require iron infusion Low salt diet Maintain physical activity Follow up with recommended workups in  2 months